# Patient Record
(demographics unavailable — no encounter records)

---

## 2025-05-01 NOTE — DISCUSSION/SUMMARY
[FreeTextEntry1] : 40 year old woman  who delivered on 25 with course complicated with PPEC. History of PEC with first child. She was eventually put on Procardia 60 mg BID. She has been home for 2 weeks. EKG normal Decrease to 30 mg in AM and 60 mg in PM and hold if less than 120/80 [EKG obtained to assist in diagnosis and management of assessed problem(s)] : EKG obtained to assist in diagnosis and management of assessed problem(s)

## 2025-05-01 NOTE — HISTORY OF PRESENT ILLNESS
[FreeTextEntry1] : 40 year old woman  who delivered on 25 with course complicated with PPEC. History of PEC with first childf. She was eventually put on Procardia 60 mg BID. She has been home for 2 weeks. EKG normal

## 2025-06-10 NOTE — DISCUSSION/SUMMARY
[EKG obtained to assist in diagnosis and management of assessed problem(s)] : EKG obtained to assist in diagnosis and management of assessed problem(s) [FreeTextEntry1] : 40 year old woman  who delivered on 25 with course complicated with PPEC. History of PEC with first childf. She was eventually put on Procardia 60 mg BID.  In May we decreased to 30 mg in AM and 60 mg in PM and hold if less than 120/80 she has been off for over a month and BP normal

## 2025-06-10 NOTE — HISTORY OF PRESENT ILLNESS
[FreeTextEntry1] : 40 year old woman  who delivered on 25 with course complicated with PPEC. History of PEC with first childf. She was eventually put on Procardia 60 mg BID.  In May we decreased to 30 mg in AM and 60 mg in PM and hold if less than 120/80- she has been off for over a month and BP normal EKG normal